# Patient Record
(demographics unavailable — no encounter records)

---

## 2024-12-03 NOTE — PROCEDURE
[FreeTextEntry1] : cpap compliance data  previous data reviewed:  compliance data reviewed : 3/2024-4/2024 % days used : 100% avg use > 4 hours:  100%   1/2024 AHI 14/hr desat 84% TST 13 hours   12/2203 PA and lateral chest xray performed using standard projections. Bones and soft tissues structures are unremarkable.Cardiac silhouette appears normal. Lung fields are clear. No infiltrate or masses noted. Mediastinal contours are normal. IMPRESSION: no acute cardiopulmonary disease  12/2023 normal yordy

## 2024-12-03 NOTE — PHYSICAL EXAM
[No Acute Distress] : no acute distress [Well Nourished] : well nourished [Well Developed] : well developed [Low Lying Soft Palate] : low lying soft palate [IV] : Mallampati Class: IV [No Resp Distress] : no resp distress [No Acc Muscle Use] : no acc muscle use [Clear to Auscultation Bilaterally] : clear to auscultation bilaterally [No Focal Deficits] : no focal deficits [Oriented x3] : oriented x3

## 2024-12-03 NOTE — HISTORY OF PRESENT ILLNESS
[Never] : never [TextBox_4] : AMANDA HARDY is a 64 year old male who presents to [facility] on tim[facility] PMH:HTN, HLD, pre-diabetic, timoteo, diagnosed more than 10 years ago DME: [facility]     on [facility]fm  well rested noc hanges  1/2024 AHI 14/hr desat 84% TST 13 hours   since last visit- halloween 2024- hernia surgery